# Patient Record
Sex: MALE | Race: OTHER | HISPANIC OR LATINO | Employment: OTHER | URBAN - METROPOLITAN AREA
[De-identification: names, ages, dates, MRNs, and addresses within clinical notes are randomized per-mention and may not be internally consistent; named-entity substitution may affect disease eponyms.]

---

## 2022-11-04 ENCOUNTER — HOSPITAL ENCOUNTER (EMERGENCY)
Facility: HOSPITAL | Age: 27
Discharge: HOME | End: 2022-11-04
Attending: EMERGENCY MEDICINE | Admitting: EMERGENCY MEDICINE

## 2022-11-04 ENCOUNTER — APPOINTMENT (OUTPATIENT)
Dept: GENERAL RADIOLOGY | Facility: HOSPITAL | Age: 27
End: 2022-11-04

## 2022-11-04 VITALS
DIASTOLIC BLOOD PRESSURE: 85 MMHG | WEIGHT: 181 LBS | RESPIRATION RATE: 19 BRPM | HEIGHT: 67 IN | BODY MASS INDEX: 28.41 KG/M2 | OXYGEN SATURATION: 97 % | SYSTOLIC BLOOD PRESSURE: 115 MMHG | TEMPERATURE: 98.4 F | HEART RATE: 67 BPM

## 2022-11-04 DIAGNOSIS — S52.501S CLOSED FRACTURE OF DISTAL END OF RIGHT RADIUS, UNSPECIFIED FRACTURE MORPHOLOGY, SEQUELA: Primary | ICD-10-CM

## 2022-11-04 PROCEDURE — 73090 X-RAY EXAM OF FOREARM: CPT

## 2022-11-04 PROCEDURE — 99283 EMERGENCY DEPT VISIT LOW MDM: CPT

## 2022-11-04 RX ORDER — HYDROCODONE BITARTRATE AND ACETAMINOPHEN 5; 325 MG/1; MG/1
1 TABLET ORAL EVERY 6 HOURS PRN
Status: DISCONTINUED | OUTPATIENT
Start: 2022-11-04 | End: 2022-11-04 | Stop reason: HOSPADM

## 2022-11-04 RX ORDER — HYDROCODONE BITARTRATE AND ACETAMINOPHEN 5; 325 MG/1; MG/1
1 TABLET ORAL EVERY 6 HOURS PRN
Qty: 12 TABLET | Refills: 0 | Status: SHIPPED | OUTPATIENT
Start: 2022-11-04

## 2022-11-04 RX ADMIN — HYDROCODONE BITARTRATE AND ACETAMINOPHEN 1 TABLET: 5; 325 TABLET ORAL at 13:06

## 2022-11-04 NOTE — DISCHARGE INSTRUCTIONS
Please take hydrocodone as needed for pain every 6-8 hours    Please call [facility] when you leave today and schedule follow-up appointment

## 2022-11-04 NOTE — ED PROVIDER NOTES
Subjective   History of Present Illness  Is a 26-year-old male presenting today due to right arm pain since yesterday.  Patient states that he was playing soccer and he jumped up to block a ball and it hit him in the right forearm.  Patient states that he had previous fracture to the right wrist and had surgery 4 years ago in [place].  He had imaging done at [facility] and they told him to come to the ED for surgery.  Patient states that his pain is 8 out of 10.  He denies nausea, vomiting and is not on a blood thinner.    History provided by:  Patient   used: No        Review of Systems   Constitutional: Negative.    HENT: Negative.    Eyes: Negative.    Respiratory: Negative.    Cardiovascular: Negative.    Gastrointestinal: Negative.    Endocrine: Negative.    Genitourinary: Negative.    Musculoskeletal: Negative.         R forearm      Skin: Negative.    Allergic/Immunologic: Negative.    Neurological: Negative.    Hematological: Negative.    Psychiatric/Behavioral: Negative.        History reviewed. No pertinent past medical history.    Allergies   Allergen Reactions   • Amoxicillin Rash       Past Surgical History:   Procedure Laterality Date   • KNEE SURGERY     • WRIST SURGERY         History reviewed. No pertinent family history.    Social History     Socioeconomic History   • Marital status: Single   Tobacco Use   • Smoking status: Never   Substance and Sexual Activity   • Alcohol use: Yes     Comment: occassionally   • Drug use: Not Currently           Objective   Physical Exam  Vitals and nursing note reviewed.   Constitutional:       General: He is not in acute distress.     Appearance: Normal appearance. He is normal weight. He is not toxic-appearing.   HENT:      Head: Normocephalic and atraumatic.      Right Ear: Tympanic membrane normal.      Left Ear: Tympanic membrane normal.      Nose: Nose normal.      Mouth/Throat:      Mouth: Mucous membranes are moist.   Eyes:       Extraocular Movements: Extraocular movements intact.      Conjunctiva/sclera: Conjunctivae normal.      Pupils: Pupils are equal, round, and reactive to light.   Cardiovascular:      Rate and Rhythm: Normal rate and regular rhythm.      Heart sounds: Normal heart sounds.   Pulmonary:      Effort: Pulmonary effort is normal.      Breath sounds: Normal breath sounds.   Musculoskeletal:         General: Tenderness present. Normal range of motion.      Right forearm: Tenderness present.      Left forearm: Normal.      Right wrist: Normal pulse.      Cervical back: Normal range of motion and neck supple.      Comments: Wrist brace and shoulder sling   Skin:     General: Skin is warm and dry.      Capillary Refill: Capillary refill takes 2 to 3 seconds.   Neurological:      General: No focal deficit present.      Mental Status: He is alert and oriented to person, place, and time.   Psychiatric:         Mood and Affect: Mood normal.         Behavior: Behavior normal.         Procedures           ED Course                                           MDM  Number of Diagnoses or Management Options  Diagnosis management comments: Consulted with Dr. Pan and sent him images.  He recommends splinting pt in sugar tong and have the patient follow-up with [facility].    I have spoken with patient. I have explained the patient´s condition, diagnoses and treatment plan based on the information available to me at this time. I have answered the patient's questions and addressed any concerns. The patient has a good  understanding of the patient´s diagnosis, condition, and treatment plan as can be expected at this point. The vital signs have been stable. The patient´s condition is stable and appropriate for discharge from the emergency department.      The patient will pursue further outpatient evaluation with the primary care physician or other designated or consulting physician as outlined in the discharge instructions. They are  agreeable to this plan of care and follow-up instructions have been explained in detail. The patient has received these instructions in written format and have expressed an understanding of the discharge instructions. The patient is aware that any significant change in condition or worsening of symptoms should prompt an immediate return to this or the closest emergency department or call to 911.         Amount and/or Complexity of Data Reviewed  Tests in the radiology section of CPT®: reviewed and ordered  Discuss the patient with other providers: yes (Dr. Pan (ortho)  )    Risk of Complications, Morbidity, and/or Mortality  Presenting problems: low  Diagnostic procedures: low  Management options: low    Patient Progress  Patient progress: stable      Final diagnoses:   Closed fracture of distal end of right radius, unspecified fracture morphology, sequela       ED Disposition  ED Disposition     ED Disposition   Discharge    Condition   Stable    Comment   --             KLEINERT KUTZ [facility]  [address]  [facility] [address]  [phone]  Schedule an appointment as soon as possible for a visit            Medication List      New Prescriptions    HYDROcodone-acetaminophen 5-325 MG per tablet  Commonly known as: NORCO  Take 1 tablet by mouth Every 6 (Six) Hours As Needed for Moderate Pain.           Where to Get Your Medications      These medications were sent to [id number] - [place], [place] - [address] AT [facility] [address] [facility] & [place] - [phone]  - [phone] FX  [address] [place] [address]    Phone: [phone]   · HYDROcodone-acetaminophen 5-325 MG per tablet          Chyna Sahni PA-C  11/04/22 1556

## 2023-01-05 ENCOUNTER — TREATMENT (OUTPATIENT)
Dept: PHYSICAL THERAPY | Facility: CLINIC | Age: 28
End: 2023-01-05
Payer: OTHER GOVERNMENT

## 2023-01-05 DIAGNOSIS — S52.501A CLOSED FRACTURE OF DISTAL END OF RIGHT RADIUS, UNSPECIFIED FRACTURE MORPHOLOGY, INITIAL ENCOUNTER: ICD-10-CM

## 2023-01-05 DIAGNOSIS — Z98.890 S/P ORIF (OPEN REDUCTION INTERNAL FIXATION) FRACTURE: Primary | ICD-10-CM

## 2023-01-05 DIAGNOSIS — Z87.81 S/P ORIF (OPEN REDUCTION INTERNAL FIXATION) FRACTURE: Primary | ICD-10-CM

## 2023-01-05 DIAGNOSIS — M25.631 DECREASED RANGE OF MOTION OF RIGHT WRIST: ICD-10-CM

## 2023-01-05 DIAGNOSIS — R29.898 WEAKNESS OF RIGHT HAND: ICD-10-CM

## 2023-01-05 PROCEDURE — 97110 THERAPEUTIC EXERCISES: CPT | Performed by: OCCUPATIONAL THERAPIST

## 2023-01-05 PROCEDURE — 97165 OT EVAL LOW COMPLEX 30 MIN: CPT | Performed by: OCCUPATIONAL THERAPIST

## 2023-01-05 NOTE — PROGRESS NOTES
Occupational Therapy Initial Evaluation and Plan of Care  Jenny  OT: [address]  [place] [address]    Patient: Chucky Werner   : 1995  Diagnosis/ICD-10 Code:  S/P ORIF (open reduction internal fixation) fracture [Z98.890, Z87.81]  Referring practitioner: Britton Vidales*  Date of Initial Visit: 2023  Today's Date: 2023  Patient seen for 1 sessions           Subjective Questionnaire: QuickDASH:       Subjective Evaluation    History of Present Illness  Date of surgery: 11/15/2022  Mechanism of injury: Pt is a RHD 28 y/o male who presents to therapy s/p R distal radius fracture ORIF. Pt reports on  he was playing soccer when the ball hit his forearm. Pt went to the ED where Xrays revealed the fractured radius. Pt reports he broke the same wrist in . Pt was referred to [facility]  where surgery was performed 11/15. Pt c/o stiffness, occasional numbness/tingling, and weakness in R wrist/hand. 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      Precautions and Work Restrictions: 25 lb lifting restrictionPain  Current pain ratin  At best pain ratin  At worst pain ratin  Progression: improved    Hand dominance: right    Diagnostic Tests  X-ray: abnormal    Treatments  Previous treatment: immobilization  Patient Goals  Patient goals for therapy: decreased edema, increased motion, increased strength, independence with ADLs/IADLs and return to sport/leisure activities  Patient goal: \"Regain as much mobility and flexibility as I can\"           Objective          Observations     Additional Elbow Observation Details  Well healed incision over distal radius.    Tenderness     Additional Tenderness Details  None reported.    Neurological Testing     Additional Neurological Details  Pt scored WNL on monofilament testing but reports altered sensation in R thenar eminence and hypersensitivity over incision.    Active Range of Motion     Right Elbow   Flexion: WFL  Extension: WFL  Forearm  supination: 50 degrees   Forearm pronation: 60 degrees     Left Wrist   Ulnar deviation: 40 degrees     Right Wrist   Wrist flexion: 50 degrees   Wrist extension: 45 degrees   Radial deviation: WFL  Ulnar deviation: 15 degrees     Left Thumb   Palmar Abduction     CMC: 70 degrees  Radial abduction    CMC: 70 degrees    Right Thumb   Palmar Abduction    CMC: 55 degrees  Radial Abduction    CMC: 55 degrees  Opposition: Pt able to oppose to base of little finger.    Strength/Myotome Testing     Left Wrist/Hand      (2nd hand position)     Trial 1: 77 lbs    Trial 2: 65 lbs    Trial 3: 55 lbs    Average: 65.67 lbs          Assessment & Plan     Assessment  Impairments: abnormal or restricted ROM, activity intolerance, impaired physical strength, lacks appropriate home exercise program and pain with function  Functional Limitations: carrying objects, lifting, sleeping, pulling, pushing and uncomfortable because of pain  Assessment details: Pt will benefit from skilled OT secondary to decreased strength/ROM and increased pain that limits pt ability to complete ADLs.  Prognosis: good    Goals  Plan Goals: 1.  The patient has limited ROM of the R wrist.  LTG 1  12 weeks:  The patient will demonstrate 75 degrees R wrist flexion/extension and 80/80 pro/sup to improve ability to carry and manipulate objects.  Status: New  STG 1  8 weeks:  The patient will demonstrate 60 degrees of R wrist flexion/extension.  Status:  New    2.  The patient has limited strength of the R wrist/hand.  LTG 2  12 weeks:  The patient will demonstrate 65 lbs of  strength and 5/5 wrist MMT in order to grasp and manipulate objects.  Status: New  STG 2  8 weeks:  The patient will demonstrate ability to tolerate MMT of R hand/wrist.  Status:  New    3.  Carrying, moving, and handling objects functional limitation.  LTG 3  12 weeks:  The patient will demonstrate 1-19 % limitation by achieving a score of 15/55 on the Quick DASH.  Status: New  STG 3   8 weeks:  The patient will demonstrate 1-19 % limitation by achieving a score of 19/55 on the Quick DASH.  Status:  New      Plan  Planned modality interventions: cryotherapy and thermotherapy (hydrocollator packs)  Planned therapy interventions: body mechanics training, fine motor coordination training, flexibility, functional ROM exercises, home exercise program, joint mobilization, manual therapy, neuromuscular re-education, postural training, soft tissue mobilization, strengthening, stretching and therapeutic activities  Frequency: 2x week  Duration in weeks: 12  Treatment plan discussed with: patient      Pt is indicated for skilled occupational therapy services.  Timed:          Therapeutic Exercise:    8     mins  79281;       Un-Timed:  Low Eval     30     Mins  16025    Timed Treatment:   8   mins   Total Treatment:     38   mins    OT SIGNATURE: Thomas Madera OT   DATE TREATMENT INITIATED: 1/5/2023  [place] License: [id number]    Initial Certification  Certification Period: 4/4/2023  I certify that the therapy services are furnished while this patient is under my care.  The services outlined above are required by this patient, and will be reviewed every 90 days.     PHYSICIAN: Britton Delcid MD      DATE:   MD NPI: [id number]  Please sign and return via fax to   [phone].. Thank you, [facility] Occupational Therapy.

## 2023-01-11 ENCOUNTER — TREATMENT (OUTPATIENT)
Dept: PHYSICAL THERAPY | Facility: CLINIC | Age: 28
End: 2023-01-11
Payer: OTHER GOVERNMENT

## 2023-01-11 DIAGNOSIS — Z98.890 S/P ORIF (OPEN REDUCTION INTERNAL FIXATION) FRACTURE: Primary | ICD-10-CM

## 2023-01-11 DIAGNOSIS — Z87.81 S/P ORIF (OPEN REDUCTION INTERNAL FIXATION) FRACTURE: Primary | ICD-10-CM

## 2023-01-11 DIAGNOSIS — R29.898 WEAKNESS OF RIGHT HAND: ICD-10-CM

## 2023-01-11 DIAGNOSIS — S52.501A CLOSED FRACTURE OF DISTAL END OF RIGHT RADIUS, UNSPECIFIED FRACTURE MORPHOLOGY, INITIAL ENCOUNTER: ICD-10-CM

## 2023-01-11 DIAGNOSIS — M25.631 DECREASED RANGE OF MOTION OF RIGHT WRIST: ICD-10-CM

## 2023-01-11 PROCEDURE — 97530 THERAPEUTIC ACTIVITIES: CPT | Performed by: OCCUPATIONAL THERAPIST

## 2023-01-11 PROCEDURE — 97110 THERAPEUTIC EXERCISES: CPT | Performed by: OCCUPATIONAL THERAPIST

## 2023-01-11 NOTE — PROGRESS NOTES
Occupational Therapy Daily Treatment Note  Jenny OT: [address] [place] [address]      Patient: Chucky Werner   : 1995  Diagnosis/ICD-10 Code:  S/P ORIF (open reduction internal fixation) fracture [Z98.890, Z87.81]  Referring practitioner: No ref. provider found  Date of Initial Visit: Type: THERAPY  Noted: 2023  Today's Date: 2023  Patient seen for 2 sessions             Subjective   Chucky Werner reports: No current pain. Pt reports slight pain at end range of supination.    Objective     See Exercise, Manual, and Modality Logs for complete treatment.       Assessment/Plan  Pt tolerated well with no c/o increased pain.  Progress per Plan of Care           Timed:  Therapeutic Exercise:    15     mins  42025;      Therapeutic Activity:     8     mins  84465;       Timed Treatment:   23   mins   Total Treatment:     23   mins        Thomas Madera OT  Occupational Therapist  [place] License: [id number]  NPI: [id number]

## 2023-01-12 ENCOUNTER — TRANSCRIBE ORDERS (OUTPATIENT)
Dept: PHYSICAL THERAPY | Facility: CLINIC | Age: 28
End: 2023-01-12
Payer: OTHER GOVERNMENT

## 2023-01-12 DIAGNOSIS — S52.611K: ICD-10-CM

## 2023-01-12 DIAGNOSIS — T84.192A: Primary | ICD-10-CM

## 2023-01-13 ENCOUNTER — TREATMENT (OUTPATIENT)
Dept: PHYSICAL THERAPY | Facility: CLINIC | Age: 28
End: 2023-01-13
Payer: OTHER GOVERNMENT

## 2023-01-13 DIAGNOSIS — R29.898 WEAKNESS OF RIGHT HAND: ICD-10-CM

## 2023-01-13 DIAGNOSIS — Z87.81 S/P ORIF (OPEN REDUCTION INTERNAL FIXATION) FRACTURE: Primary | ICD-10-CM

## 2023-01-13 DIAGNOSIS — M25.631 DECREASED RANGE OF MOTION OF RIGHT WRIST: ICD-10-CM

## 2023-01-13 DIAGNOSIS — Z98.890 S/P ORIF (OPEN REDUCTION INTERNAL FIXATION) FRACTURE: Primary | ICD-10-CM

## 2023-01-13 DIAGNOSIS — S52.501A CLOSED FRACTURE OF DISTAL END OF RIGHT RADIUS, UNSPECIFIED FRACTURE MORPHOLOGY, INITIAL ENCOUNTER: ICD-10-CM

## 2023-01-13 PROCEDURE — 97110 THERAPEUTIC EXERCISES: CPT | Performed by: OCCUPATIONAL THERAPIST

## 2023-01-13 PROCEDURE — 97530 THERAPEUTIC ACTIVITIES: CPT | Performed by: OCCUPATIONAL THERAPIST

## 2023-01-13 NOTE — PROGRESS NOTES
Occupational Therapy Daily Treatment Note  Jenny OT: [address] [place] [address]      Patient: Chucky Werner   : 1995  Diagnosis/ICD-10 Code:  S/P ORIF (open reduction internal fixation) fracture [Z98.890, Z87.81]  Referring practitioner: Britton Vidales*  Date of Initial Visit: Type: THERAPY  Noted: 2023  Today's Date: 2023  Patient seen for 3 sessions             Subjective   Chucky Werner reports: More flexibility. No current pain.    Objective     See Exercise, Manual, and Modality Logs for complete treatment.       Assessment/Plan  Pt tolerated well with no c/o increased pain. OT provided yellow putty HEP. Teachback successful.  Progress per Plan of Care           Timed:  Therapeutic Exercise:    15     mins  29770;     Therapeutic Activity:     8     mins  04666;       Timed Treatment:   23   mins   Total Treatment:     23   mins        Thomas Madera OT  Occupational Therapist  [place] License: [id number]  NPI: [id number]

## 2023-01-18 ENCOUNTER — TREATMENT (OUTPATIENT)
Dept: PHYSICAL THERAPY | Facility: CLINIC | Age: 28
End: 2023-01-18
Payer: OTHER GOVERNMENT

## 2023-01-18 DIAGNOSIS — R29.898 WEAKNESS OF RIGHT HAND: ICD-10-CM

## 2023-01-18 DIAGNOSIS — Z98.890 S/P ORIF (OPEN REDUCTION INTERNAL FIXATION) FRACTURE: Primary | ICD-10-CM

## 2023-01-18 DIAGNOSIS — S52.501A CLOSED FRACTURE OF DISTAL END OF RIGHT RADIUS, UNSPECIFIED FRACTURE MORPHOLOGY, INITIAL ENCOUNTER: ICD-10-CM

## 2023-01-18 DIAGNOSIS — Z87.81 S/P ORIF (OPEN REDUCTION INTERNAL FIXATION) FRACTURE: Primary | ICD-10-CM

## 2023-01-18 DIAGNOSIS — M25.631 DECREASED RANGE OF MOTION OF RIGHT WRIST: ICD-10-CM

## 2023-01-18 PROCEDURE — 97140 MANUAL THERAPY 1/> REGIONS: CPT | Performed by: OCCUPATIONAL THERAPIST

## 2023-01-18 PROCEDURE — 97110 THERAPEUTIC EXERCISES: CPT | Performed by: OCCUPATIONAL THERAPIST

## 2023-01-18 NOTE — PROGRESS NOTES
"Occupational Therapy Daily Treatment Note  Jenny OT: [address] [place] [address]      Patient: Chucky Werner   : 1995  Diagnosis/ICD-10 Code:  S/P ORIF (open reduction internal fixation) fracture [Z98.890, Z87.81]  Referring practitioner: Britton Vidales*  Date of Initial Visit: Type: THERAPY  Noted: 2023  Today's Date: 2023  Patient seen for 4 sessions             Subjective   Chucky Werner reports: No pain. \"I feel like I'm getting the strength back in my entire arm\" Pt reports completing 10 lb medicine ball throws with no problems.    Objective     See Exercise, Manual, and Modality Logs for complete treatment.       Assessment/Plan  Pt tolerated well this date with no c/o increased pain. Pt displays improved strength.  Progress per Plan of Care           Timed:  Manual Therapy:    8     mins  10844;  Therapeutic Exercise:    9     mins  17901;     Therapeutic Activity:     6     mins  49933;       Timed Treatment:   23   mins   Total Treatment:     23   mins        Thomas Madera OT  Occupational Therapist  [place] License: [id number]  NPI: [id number]  "

## 2023-01-20 ENCOUNTER — TREATMENT (OUTPATIENT)
Dept: PHYSICAL THERAPY | Facility: CLINIC | Age: 28
End: 2023-01-20
Payer: OTHER GOVERNMENT

## 2023-01-20 DIAGNOSIS — R29.898 WEAKNESS OF RIGHT HAND: ICD-10-CM

## 2023-01-20 DIAGNOSIS — Z87.81 S/P ORIF (OPEN REDUCTION INTERNAL FIXATION) FRACTURE: Primary | ICD-10-CM

## 2023-01-20 DIAGNOSIS — S52.501A CLOSED FRACTURE OF DISTAL END OF RIGHT RADIUS, UNSPECIFIED FRACTURE MORPHOLOGY, INITIAL ENCOUNTER: ICD-10-CM

## 2023-01-20 DIAGNOSIS — M25.631 DECREASED RANGE OF MOTION OF RIGHT WRIST: ICD-10-CM

## 2023-01-20 DIAGNOSIS — Z98.890 S/P ORIF (OPEN REDUCTION INTERNAL FIXATION) FRACTURE: Primary | ICD-10-CM

## 2023-01-20 PROCEDURE — 97530 THERAPEUTIC ACTIVITIES: CPT | Performed by: OCCUPATIONAL THERAPIST

## 2023-01-20 PROCEDURE — 97110 THERAPEUTIC EXERCISES: CPT | Performed by: OCCUPATIONAL THERAPIST

## 2023-01-20 NOTE — PROGRESS NOTES
Occupational Therapy Daily Treatment Note  Jenny OT: [address] [place] [address]      Patient: Chucky Werner   : 1995  Diagnosis/ICD-10 Code:  S/P ORIF (open reduction internal fixation) fracture [Z98.890, Z87.81]  Referring practitioner: No ref. provider found  Date of Initial Visit: Type: THERAPY  Noted: 2023  Today's Date: 2023  Patient seen for 5 sessions             Subjective   Chucky Werner reports: No pain.    Objective     See Exercise, Manual, and Modality Logs for complete treatment.       Assessment/Plan  OT provided wrist 3 way strengthening HEP. Teachback successful. Pt displays full wrist flexion/extension this date. Pt tolerated wall push up well but reports regular push up was over stretching. Pt tolerated remainder of treatment well. Pt displays improved strength.  Progress per Plan of Care           Timed:  Therapeutic Exercise:    20     mins  72298;      Therapeutic Activity:     10     mins  94757;       Timed Treatment:   30   mins   Total Treatment:     30   mins        Thomas Madera OT  Occupational Therapist  [place] License: [id number]  NPI: [id number]

## 2023-01-27 ENCOUNTER — TREATMENT (OUTPATIENT)
Dept: PHYSICAL THERAPY | Facility: CLINIC | Age: 28
End: 2023-01-27
Payer: OTHER GOVERNMENT

## 2023-01-27 DIAGNOSIS — S52.501A CLOSED FRACTURE OF DISTAL END OF RIGHT RADIUS, UNSPECIFIED FRACTURE MORPHOLOGY, INITIAL ENCOUNTER: ICD-10-CM

## 2023-01-27 DIAGNOSIS — R29.898 WEAKNESS OF RIGHT HAND: ICD-10-CM

## 2023-01-27 DIAGNOSIS — Z98.890 S/P ORIF (OPEN REDUCTION INTERNAL FIXATION) FRACTURE: Primary | ICD-10-CM

## 2023-01-27 DIAGNOSIS — M25.631 DECREASED RANGE OF MOTION OF RIGHT WRIST: ICD-10-CM

## 2023-01-27 DIAGNOSIS — Z87.81 S/P ORIF (OPEN REDUCTION INTERNAL FIXATION) FRACTURE: Primary | ICD-10-CM

## 2023-01-27 PROCEDURE — 97110 THERAPEUTIC EXERCISES: CPT | Performed by: OCCUPATIONAL THERAPIST

## 2023-01-27 PROCEDURE — 97530 THERAPEUTIC ACTIVITIES: CPT | Performed by: OCCUPATIONAL THERAPIST

## 2023-01-27 NOTE — PROGRESS NOTES
Occupational Therapy Daily Treatment Note  Jenny OT: [address] [place] [address]      Patient: Chucky Werner   : 1995  Diagnosis/ICD-10 Code:  S/P ORIF (open reduction internal fixation) fracture [Z98.890, Z87.81]  Referring practitioner: No ref. provider found  Date of Initial Visit: Type: THERAPY  Noted: 2023  Today's Date: 2023  Patient seen for 6 sessions             Subjective   Chucky Werner reports: No current pain. Pt reports he was able to complete 10 push ups and was able to carry 25 lbs comfortably.    Objective     See Exercise, Manual, and Modality Logs for complete treatment.       Assessment/Plan  Pt tolerated well with no c/o increased pain this date.  Progress per Plan of Care           Timed:  Therapeutic Exercise:    23     mins  63559;      Therapeutic Activity:     17     mins  86761;       Timed Treatment:   40   mins   Total Treatment:     40   mins        Thomas Madera OT  Occupational Therapist  [place] License: [id number]  NPI: [id number]

## 2023-02-01 ENCOUNTER — TREATMENT (OUTPATIENT)
Dept: PHYSICAL THERAPY | Facility: CLINIC | Age: 28
End: 2023-02-01
Payer: OTHER GOVERNMENT

## 2023-02-01 DIAGNOSIS — R29.898 WEAKNESS OF RIGHT HAND: ICD-10-CM

## 2023-02-01 DIAGNOSIS — S52.501A CLOSED FRACTURE OF DISTAL END OF RIGHT RADIUS, UNSPECIFIED FRACTURE MORPHOLOGY, INITIAL ENCOUNTER: ICD-10-CM

## 2023-02-01 DIAGNOSIS — M25.631 DECREASED RANGE OF MOTION OF RIGHT WRIST: ICD-10-CM

## 2023-02-01 DIAGNOSIS — Z87.81 S/P ORIF (OPEN REDUCTION INTERNAL FIXATION) FRACTURE: Primary | ICD-10-CM

## 2023-02-01 DIAGNOSIS — Z98.890 S/P ORIF (OPEN REDUCTION INTERNAL FIXATION) FRACTURE: Primary | ICD-10-CM

## 2023-02-01 PROCEDURE — 97530 THERAPEUTIC ACTIVITIES: CPT | Performed by: OCCUPATIONAL THERAPIST

## 2023-02-01 PROCEDURE — 97110 THERAPEUTIC EXERCISES: CPT | Performed by: OCCUPATIONAL THERAPIST

## 2023-02-01 NOTE — PROGRESS NOTES
Occupational Therapy Daily Treatment Note  Jenny OT: [address] [place] [address]      Patient: Chucky Werner   : 1995  Diagnosis/ICD-10 Code:  S/P ORIF (open reduction internal fixation) fracture [Z98.890, Z87.81]  Referring practitioner: No ref. provider found  Date of Initial Visit: Type: THERAPY  Noted: 2023  Today's Date: 2023  Patient seen for 7 sessions             Subjective   Chucky Werner reports: Completing light weight lifting with no difficulties. He feels like his range of motion is     Objective     See Exercise, Manual, and Modality Logs for complete treatment.       Assessment/Plan  Pt tolerated well this date with no c/o increased pain. Pt displays improved strength.  Progress per Plan of Care           Timed:  Therapeutic Exercise:    25     mins  75759;     Therapeutic Activity:     15     mins  47531;       Timed Treatment:   40   mins   Total Treatment:     40   mins        Thomas Madera OT  Occupational Therapist  [place] License: [id number]  NPI: [id number]

## 2023-02-03 ENCOUNTER — TREATMENT (OUTPATIENT)
Dept: PHYSICAL THERAPY | Facility: CLINIC | Age: 28
End: 2023-02-03
Payer: OTHER GOVERNMENT

## 2023-02-03 DIAGNOSIS — Z98.890 S/P ORIF (OPEN REDUCTION INTERNAL FIXATION) FRACTURE: Primary | ICD-10-CM

## 2023-02-03 DIAGNOSIS — M25.631 DECREASED RANGE OF MOTION OF RIGHT WRIST: ICD-10-CM

## 2023-02-03 DIAGNOSIS — Z87.81 S/P ORIF (OPEN REDUCTION INTERNAL FIXATION) FRACTURE: Primary | ICD-10-CM

## 2023-02-03 DIAGNOSIS — R29.898 WEAKNESS OF RIGHT HAND: ICD-10-CM

## 2023-02-03 DIAGNOSIS — S52.501A CLOSED FRACTURE OF DISTAL END OF RIGHT RADIUS, UNSPECIFIED FRACTURE MORPHOLOGY, INITIAL ENCOUNTER: ICD-10-CM

## 2023-02-03 PROCEDURE — 97530 THERAPEUTIC ACTIVITIES: CPT | Performed by: OCCUPATIONAL THERAPIST

## 2023-02-03 PROCEDURE — 97110 THERAPEUTIC EXERCISES: CPT | Performed by: OCCUPATIONAL THERAPIST

## 2023-02-03 NOTE — PROGRESS NOTES
Occupational Therapy Progress Note  Jenny OT: [address] [place] [address]      Patient: Chucky Werner   : 1995  Diagnosis/ICD-10 Code:  S/P ORIF (open reduction internal fixation) fracture [Z98.890, Z87.81]  Referring practitioner: No ref. provider found  Date of Initial Visit: Type: THERAPY  Noted: 2023  Today's Date: 2/3/2023  Patient seen for 8 sessions         QuickDASH:     Subjective   Chucky Werner reports: No current pain. Pt reports he has been progressing with working out at home but still encounters occasional pain when lifting heavier weights.    Objective          Observations     Additional Elbow Observation Details  Well healed incision over distal radius.    Tenderness     Additional Tenderness Details  None reported.    Neurological Testing     Additional Neurological Details  Pt scored WNL on monofilament testing.    Active Range of Motion     Right Elbow   Flexion: WFL  Extension: WFL  Forearm supination: WFL  Forearm pronation: WFL    Left Wrist   Ulnar deviation: 40 degrees     Right Wrist   Wrist flexion: WFL  Wrist extension: 75 degrees   Radial deviation: WFL  Ulnar deviation: 45 degrees     Left Thumb   Palmar Abduction     CMC: 70 degrees  Radial abduction    CMC: 70 degrees    Right Thumb   Palmar Abduction    CMC: 65 degrees  Radial Abduction    CMC: 55 degrees  Opposition: Pt able to oppose to base of little finger.    Strength/Myotome Testing     Left Wrist/Hand      (2nd hand position)     Trial 1: 77 lbs    Trial 2: 65 lbs    Trial 3: 55 lbs    Average: 65.67 lbs    Right Wrist/Hand   Wrist extension: 5  Wrist flexion: 5  Radial deviation: 4+  Ulnar deviation: 4+     (2nd hand position)     Trial 1: 63 lbs    Trial 2: 63 lbs    Trial 3: 60 lbs    Average: 62 lbs        See Exercise, Manual, and Modality Logs for complete treatment.       Assessment & Plan     Assessment  Impairments: abnormal or restricted ROM, activity intolerance,  impaired physical strength, lacks appropriate home exercise program and pain with function  Functional Limitations: carrying objects, lifting, sleeping, pulling, pushing and uncomfortable because of pain  Assessment details: Pt reports weakness in R wrist has prevented him from completing some of his PT. Pt displays improved strength and ROM this date. Pt met 3/3 STGs and 2/3 LTGs.   Prognosis: good    Goals  Plan Goals: 1.  The patient has limited ROM of the R wrist.  LTG 1  12 weeks:  The patient will demonstrate 75 degrees R wrist flexion/extension and 80/80 pro/sup to improve ability to carry and manipulate objects.  Status: Met  STG 1  8 weeks:  The patient will demonstrate 60 degrees of R wrist flexion/extension.  Status:  Met    2.  The patient has limited strength of the R wrist/hand.  LTG 2  12 weeks:  The patient will demonstrate 65 lbs of  strength and 5/5 wrist MMT in order to grasp and manipulate objects.  Status: Not met  STG 2  8 weeks:  The patient will demonstrate ability to tolerate MMT of R hand/wrist.  Status:  Met    3.  Carrying, moving, and handling objects functional limitation.  LTG 3  12 weeks:  The patient will demonstrate 1-19 % limitation by achieving a score of 15/55 on the Quick DASH.  Status: Met  STG 3  8 weeks:  The patient will demonstrate 1-19 % limitation by achieving a score of 19/55 on the Quick DASH.  Status:  Met    Plan  Planned modality interventions: cryotherapy and thermotherapy (hydrocollator packs)  Planned therapy interventions: body mechanics training, fine motor coordination training, flexibility, functional ROM exercises, home exercise program, joint mobilization, manual therapy, neuromuscular re-education, postural training, soft tissue mobilization, strengthening, stretching and therapeutic activities  Frequency: 2x week  Duration in weeks: 12  Treatment plan discussed with: patient        Progress strengthening /stabilization /functional activity            Timed:  Therapeutic Exercise:    20     mins  87957;     Therapeutic Activity:     10     mins  63258;       Timed Treatment:   30   mins   Total Treatment:     30   mins        Thomas Madera OT  Occupational Therapist  [place] License: [id number]  NPI: [id number]

## 2023-02-10 ENCOUNTER — TELEPHONE (OUTPATIENT)
Dept: PHYSICAL THERAPY | Facility: CLINIC | Age: 28
End: 2023-02-10

## 2023-02-22 ENCOUNTER — TELEPHONE (OUTPATIENT)
Dept: PHYSICAL THERAPY | Facility: CLINIC | Age: 28
End: 2023-02-22

## 2023-03-29 ENCOUNTER — DOCUMENTATION (OUTPATIENT)
Dept: PHYSICAL THERAPY | Facility: CLINIC | Age: 28
End: 2023-03-29
Payer: OTHER GOVERNMENT

## 2023-03-29 DIAGNOSIS — M25.631 DECREASED RANGE OF MOTION OF RIGHT WRIST: ICD-10-CM

## 2023-03-29 DIAGNOSIS — S52.501A CLOSED FRACTURE OF DISTAL END OF RIGHT RADIUS, UNSPECIFIED FRACTURE MORPHOLOGY, INITIAL ENCOUNTER: ICD-10-CM

## 2023-03-29 DIAGNOSIS — Z98.890 S/P ORIF (OPEN REDUCTION INTERNAL FIXATION) FRACTURE: Primary | ICD-10-CM

## 2023-03-29 DIAGNOSIS — Z87.81 S/P ORIF (OPEN REDUCTION INTERNAL FIXATION) FRACTURE: Primary | ICD-10-CM

## 2023-03-29 DIAGNOSIS — R29.898 WEAKNESS OF RIGHT HAND: ICD-10-CM

## 2023-03-29 NOTE — PROGRESS NOTES
Discharge Summary  Discharge Summary from Occupational Therapy Report  Jenny  OT: [address] [place] [address]    Dates  OT visit: 1/5/23 - 2/3/23  Number of Visits: 8     Discharge Status of Patient: See Progress Note dated 2/3/23    Goals: Partially Met    Discharge Plan: Continue with current home exercise program as instructed    Comments Unanticipated discharge.    Date of Discharge 2/3/23        Thomas Madera OT  Occupational Therapist  [place] License:[id number]